# Patient Record
Sex: FEMALE | Race: WHITE | NOT HISPANIC OR LATINO | Employment: OTHER | URBAN - METROPOLITAN AREA
[De-identification: names, ages, dates, MRNs, and addresses within clinical notes are randomized per-mention and may not be internally consistent; named-entity substitution may affect disease eponyms.]

---

## 2023-12-20 ENCOUNTER — APPOINTMENT (OUTPATIENT)
Dept: NEUROLOGY | Facility: CLINIC | Age: 53
End: 2023-12-20
Payer: COMMERCIAL

## 2024-02-21 ENCOUNTER — APPOINTMENT (OUTPATIENT)
Dept: NEUROLOGY | Facility: CLINIC | Age: 54
End: 2024-02-21
Payer: COMMERCIAL

## 2024-02-26 ENCOUNTER — APPOINTMENT (OUTPATIENT)
Dept: NEUROLOGY | Facility: CLINIC | Age: 54
End: 2024-02-26
Payer: COMMERCIAL

## 2024-07-03 ENCOUNTER — APPOINTMENT (OUTPATIENT)
Dept: NEUROLOGY | Facility: CLINIC | Age: 54
End: 2024-07-03
Payer: COMMERCIAL

## 2024-07-23 ENCOUNTER — APPOINTMENT (OUTPATIENT)
Dept: NEUROLOGY | Facility: CLINIC | Age: 54
End: 2024-07-23
Payer: COMMERCIAL

## 2024-07-23 VITALS
HEIGHT: 67 IN | WEIGHT: 169 LBS | DIASTOLIC BLOOD PRESSURE: 64 MMHG | BODY MASS INDEX: 26.53 KG/M2 | HEART RATE: 60 BPM | SYSTOLIC BLOOD PRESSURE: 106 MMHG

## 2024-07-23 DIAGNOSIS — G62.9 NEUROPATHY: ICD-10-CM

## 2024-07-23 DIAGNOSIS — G90.A POTS (POSTURAL ORTHOSTATIC TACHYCARDIA SYNDROME): Primary | ICD-10-CM

## 2024-07-23 DIAGNOSIS — R55 SYNCOPE, UNSPECIFIED SYNCOPE TYPE: ICD-10-CM

## 2024-07-23 PROCEDURE — 3008F BODY MASS INDEX DOCD: CPT | Performed by: SPECIALIST

## 2024-07-23 PROCEDURE — 99205 OFFICE O/P NEW HI 60 MIN: CPT | Performed by: SPECIALIST

## 2024-07-23 PROCEDURE — 1036F TOBACCO NON-USER: CPT | Performed by: SPECIALIST

## 2024-07-23 PROCEDURE — 99417 PROLNG OP E/M EACH 15 MIN: CPT | Performed by: SPECIALIST

## 2024-07-23 RX ORDER — CIPROFLOXACIN 500 MG/1
500 TABLET ORAL 2 TIMES DAILY
COMMUNITY

## 2024-07-23 RX ORDER — DOXYCYCLINE HYCLATE 100 MG
100 TABLET ORAL 2 TIMES DAILY
COMMUNITY
Start: 2024-02-23

## 2024-07-23 RX ORDER — IVABRADINE 5 MG/1
5 TABLET, FILM COATED ORAL
COMMUNITY
Start: 2023-02-19

## 2024-07-23 RX ORDER — ROSUVASTATIN CALCIUM 10 MG/1
10 TABLET, COATED ORAL DAILY
COMMUNITY

## 2024-07-23 RX ORDER — NEBIVOLOL 10 MG/1
2.5 TABLET ORAL
COMMUNITY

## 2024-07-23 RX ORDER — POLYETHYLENE GLYCOL-3350 AND ELECTROLYTES 236; 6.74; 5.86; 2.97; 22.74 G/274.31G; G/274.31G; G/274.31G; G/274.31G; G/274.31G
4000 POWDER, FOR SOLUTION ORAL
COMMUNITY
Start: 2023-12-05

## 2024-07-23 RX ORDER — ICOSAPENT ETHYL 1 G/1
2 CAPSULE ORAL 2 TIMES DAILY
COMMUNITY
Start: 2022-10-12

## 2024-07-23 RX ORDER — METHYLPREDNISOLONE 4 MG/1
4 TABLET ORAL
COMMUNITY
Start: 2024-06-28

## 2024-07-23 RX ORDER — METOPROLOL SUCCINATE 25 MG/1
25 TABLET, EXTENDED RELEASE ORAL DAILY
COMMUNITY

## 2024-07-23 RX ORDER — PANTOPRAZOLE SODIUM 40 MG/1
40 TABLET, DELAYED RELEASE ORAL
COMMUNITY
Start: 2022-08-25

## 2024-07-23 RX ORDER — EZETIMIBE 10 MG/1
5 TABLET ORAL NIGHTLY
COMMUNITY
Start: 2023-10-20

## 2024-07-23 RX ORDER — RANOLAZINE 500 MG/1
500 TABLET, EXTENDED RELEASE ORAL 2 TIMES DAILY
COMMUNITY

## 2024-07-23 RX ORDER — DOXYCYCLINE 100 MG/1
100 CAPSULE ORAL 2 TIMES DAILY
COMMUNITY

## 2024-07-23 ASSESSMENT — PATIENT HEALTH QUESTIONNAIRE - PHQ9
1. LITTLE INTEREST OR PLEASURE IN DOING THINGS: NOT AT ALL
2. FEELING DOWN, DEPRESSED OR HOPELESS: NOT AT ALL
SUM OF ALL RESPONSES TO PHQ9 QUESTIONS 1 AND 2: 0

## 2024-07-23 ASSESSMENT — ENCOUNTER SYMPTOMS
DEPRESSION: 0
LOSS OF SENSATION IN FEET: 0
OCCASIONAL FEELINGS OF UNSTEADINESS: 0

## 2024-07-23 ASSESSMENT — ANXIETY QUESTIONNAIRES
1. FEELING NERVOUS, ANXIOUS, OR ON EDGE: NOT AT ALL
6. BECOMING EASILY ANNOYED OR IRRITABLE: NOT AT ALL
GAD7 TOTAL SCORE: 0
5. BEING SO RESTLESS THAT IT IS HARD TO SIT STILL: NOT AT ALL
2. NOT BEING ABLE TO STOP OR CONTROL WORRYING: NOT AT ALL
3. WORRYING TOO MUCH ABOUT DIFFERENT THINGS: NOT AT ALL
IF YOU CHECKED OFF ANY PROBLEMS ON THIS QUESTIONNAIRE, HOW DIFFICULT HAVE THESE PROBLEMS MADE IT FOR YOU TO DO YOUR WORK, TAKE CARE OF THINGS AT HOME, OR GET ALONG WITH OTHER PEOPLE: NOT DIFFICULT AT ALL
4. TROUBLE RELAXING: NOT AT ALL
7. FEELING AFRAID AS IF SOMETHING AWFUL MIGHT HAPPEN: NOT AT ALL

## 2024-07-23 NOTE — PROGRESS NOTES
Date of Service: 7/23/2024  Patient: Aimee Saldivar  MRN: [id number]  Referring Provider: No ref. provider found  Primary Care Physician: No primary care provider on file.     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    She was initially diagnosed with POTS in early 2003 about 20 years ago with her symptoms relatively resolved with salt tablets used.    She had a new remission in the last few years with symptoms including orthostatic palpitations, dizziness, fluctuating vision, headaches, tachycardia, graying of vision, dyspnea, rare syncope, fatigue, cold feet, headaches, difficulty with concentration, tingling of the feet, increase in sweating, bloating constipation dry mouth dry eyes and insomnia .    She was evaluated by different cardiologist in the past and she was tried on different medications including despite  Cardizem, Sotalol, Bisoprolol, Atenolol, Metoprolol, that she was unable to tolerate due to side effects including hypotension and bradycardia.    She has been recently on Bystolic and Corlanor she reports dissatisfaction with her current treatment due to significant side effects like low blood pressure and weight gain.      Her POTS symptoms had been worsening lately due to increased stressors.    The disease has been affecting her activities of daily living, she retired from nursing      Medical Management:  The patient has a history of Takotsubo cardiomyopathy, mild emphysema, and mild apical scarring, possibly related to radiation therapy for breast cancer. She previously used Ranexa, which was discontinued due to leg swelling      Additional  Medical History:  The patient has been treated for breast cancer with lumpectomy and radiation, and underwent a hysterectomy with preservation of her ovaries, followed by menopause a year later. She has a history of elevated cholesterol and manages it with Crestor and Ezetimibe.    Lifestyle:  The patient follows a mostly plant-based diet and quit smoking two years ago. She resides in [place] but frequently travels to [place] for medical care and other purposes.         Diagnostic Test Results:    - History of Tilt Table Test: Positive for POTS, specific date N/A       COMPLETE ECHOCARDIOGRAM EXERCISE STRESS TEST W/ STRAIN (11/07/2022     The maximal heart rate is 137 bpm which represents 82% of age predicted target HR. Exercise was stopped due to patient request. Beta blockers were continued for the test. No evidence of ischemia at achieved cardiac workload. Non-diagnostic stress ECG due to inability to achieve target heart rate and 82% of the target. Hypertensive blood pressure response during stress. Normal left ventricular systolic function with an ejection fraction of 55-60% at rest. No regional wall motion abnormalities during stress. The ejection fraction is 65-70% with stress. This is a non diagnostic stress echocardiogram due to the patients inability to achieve target heart rate. This test has reduced sensitivity due to low cardiac workload achieved. Global Longitudinal Strain (avg) is calculated at -14.7% with inferior strain abnormality.           Problems Assessed/Relevant:  Problem List Items Addressed This Visit    None      Social History     Tobacco Use    Smoking status: Never    Smokeless tobacco: Never   Substance Use Topics    Alcohol use: Never      Allergies   Allergen Reactions    Carisoprodol Hallucinations and Unknown     Hallucinations    Oxycodone Hallucinations and Unknown     Hallucinations    Propoxyphene Hallucinations    Tramadol Hallucinations and Unknown     Hallucinations    Adhesive  Unknown and Rash     Other reaction(s): REDNESS    Carisoprodol-Aspirin Unknown     Other reaction(s): Mental Status Change    Hallucinations    Prednisone Unknown     Other reaction(s): Other (see comments)    Icosapent Ethyl Swelling    Meperidine Hallucinations and Unknown     Hallucinations    Metoprolol Succinate Unknown     Tunnel vision, migraine, diarrhea    Oxycodone-Acetaminophen Unknown     Other reaction(s): Hallucinations    Propoxyphene N-Acetaminophen Hallucinations and Unknown     Other reaction(s): HALLUCINATIONS    Somatropin Hallucinations, Hives and Unknown     Other reaction(s): SEVERE HALLUCINATIONS        Medications:    Current Outpatient Medications:     ezetimibe (Zetia) 10 mg tablet, Take 0.5 tablets (5 mg) by mouth once daily at bedtime., Disp: , Rfl:     ivabradine (Corlanor) 5 mg tablet, Take 0.5 tablets (2.5 mg) by mouth 2 times a day., Disp: , Rfl:     metoprolol succinate XL (Toprol-XL) 25 mg 24 hr tablet, Take 1 tablet (25 mg) by mouth once daily., Disp: , Rfl:     nebivolol (Bystolic) 10 mg tablet, Take 2.5 mg by mouth., Disp: , Rfl:     ranolazine (Ranexa) 500 mg 12 hr tablet, Take 1 tablet (500 mg) by mouth 2 times a day. Do not crush, chew, or split., Disp: , Rfl:     rosuvastatin (Crestor) 10 mg tablet, Take 1 tablet (10 mg) by mouth early in the morning.., Disp: , Rfl:     ciprofloxacin (Cipro) 500 mg tablet, Take 1 tablet (500 mg) by mouth 2 times a day., Disp: , Rfl:     Corlanor 5 mg tablet, 1 tablet (5 mg)., Disp: , Rfl:     dietary supplement capsule, once daily., Disp: , Rfl:     doxycycline (Vibra-Tabs) 100 mg tablet, Take 1 tablet (100 mg) by mouth twice a day., Disp: , Rfl:     doxycycline (Vibramycin) 100 mg capsule, Take 1 capsule (100 mg) by mouth 2 times a day., Disp: , Rfl:     GaviLyte-G 236-22.74-6.74 -5.86 gram solution, Take 4,000 mL by mouth., Disp: , Rfl:     icosapent ethyL (Vascepa) 1 gram capsule, Take 2 capsules (2 g) by mouth 2 times a day., Disp: ,  "Rfl:     methylPREDNISolone (Medrol Dospak) 4 mg tablets, Take 1 tablet (4 mg) by mouth., Disp: , Rfl:     pantoprazole (ProtoNix) 40 mg EC tablet, Take 1 tablet (40 mg) by mouth., Disp: , Rfl:                     Physical Exam:     General Physical Exam:  /64   Pulse 60   Ht 1.702 m (5' 7\")   Wt 76.7 kg (169 lb)   BMI 26.47 kg/m²      She is not in any acute distress.  She has bilateral pes cavus, hyper flexibility of the joints with bilateral elbow and wrist hyperextension  Musculoskeletal: No scoliosis, lordosis, kyphosis,  or hammertoes     Neurological Exam:   Mental status reveals: alert and oriented to person, place, and date. Speech is intact to conversation. Fund of knowledge is normal.     Cranial nerves:  CN 2   Visual fields full to confrontation.   CN 3, 4, 6   Pupils round, 4 mm in diameter, equally reactive to light. Lids symmetric; no ptosis. EOMs normal alignment, full range.   No nystagmus.   CN 5   Facial sensation intact bilaterally.   CN 7   Normal and symmetric facial strength. Nasolabial folds symmetric.   CN 8   Hearing intact to conversation.   CN 9   Palate elevates symmetrically.   CN 11   Normal strength of shoulder shrug and neck turning.   CN 12   Tongue midline, with normal bulk and strength; no fasciculations.      Motor:  Muscle strength, bulk and tone are normal. There are no scapular winging, fasciculations, tremor or other abnormal movement.    Neck flexion and neck extension are normal (MRC 5/5).       Reflexes: Suppressed throughout       Plantars: toes downgoing to plantar stimulation. No clonus or other pathologic reflexes present.      Sensory:   Pinprick sensation and touch sensation are normal and symmetrical.  Position senses are normal at the toes.  Vibration senses are normal at the toes and ankles.  All sensory modalities were normal in the hands and upper extremities.       Coordination:  Finger-nose-finger is normal without dysmetria or overshoot and " heel-to-shin is normal. Rapid alternating movements in hands and feet are normal.     Gait:  Station is stable with a normal base. Gait is stable with a normal arm swing and speed. There is no ataxia, shuffling, steppage or waddling gait. Tandem gait is intact. Romberg sign is negative.      Impression/Plan:     Impression:  Aimee Saldivar is a 53 y.o. who presents with:    Postural Orthostatic Tachycardia Syndrome (POTS)    - Plan:    - Schedule a tilt table test to reassess POTS and autonomic function.    - Consider lab workup, skin biopsy, lip biopsy and adjusting her medications based on test results.    - Discuss the potential use of Mestinon (Pyridostigmine) starting at 30 mg three times daily, with a plan to reassess efficacy and tolerance.    - Arrange for a comprehensive cardiac evaluation including a Holter monitor,   and echocardiogram.    - Coordination with her cardiologist will be essential to manage her complex cardiac medications and conditions.    - EMG/NCS of the right upper and lower extremities to assess for peripheral neuropathy.      General Health and Lifestyle Management    - Plan:    - Encourage lifestyle modifications including the use of compression stockings and possibly an abdominal binder.    - Recommend continuation of salt intake as previously beneficial for POTS management.    -To address stressors that may affect her ability to cope with POTS.    - Schedule follow-up appointments to monitor her response to any new treatments and adjust as necessary.    Reviewed and approved by ARTEMIO BERNARDO on 7/23/24 at 12:29 PM.    I personally spent [75 ] minutes on the day of the visit completing the review of the medical record and outside records, obtaining history and performing an appropriate physical exam, patient care, counseling and education, placing orders, independently reviewing results, communicating with the patient coordinating care and performing appropriate clinical  documentation.      Ruth Garcia M.D.

## 2024-07-25 ENCOUNTER — PATIENT MESSAGE (OUTPATIENT)
Dept: NEUROLOGY | Facility: CLINIC | Age: 54
End: 2024-07-25
Payer: COMMERCIAL

## 2024-07-25 DIAGNOSIS — R51.9 NONINTRACTABLE HEADACHE, UNSPECIFIED CHRONICITY PATTERN, UNSPECIFIED HEADACHE TYPE: Primary | ICD-10-CM

## 2024-07-25 DIAGNOSIS — C50.912 MALIGNANT NEOPLASM OF LEFT FEMALE BREAST, UNSPECIFIED ESTROGEN RECEPTOR STATUS, UNSPECIFIED SITE OF BREAST (MULTI): ICD-10-CM

## 2024-07-26 NOTE — PROGRESS NOTES
The patient has been complaining of headache and concerned about it.   I will order MRI, MRA brain. She will be followed up.     Ruth

## 2024-07-29 ENCOUNTER — CLINICAL SUPPORT (OUTPATIENT)
Dept: PHYSICAL THERAPY | Facility: CLINIC | Age: 54
End: 2024-07-29
Payer: COMMERCIAL

## 2024-07-29 ENCOUNTER — TELEPHONE (OUTPATIENT)
Dept: PRIMARY CARE | Facility: CLINIC | Age: 54
End: 2024-07-29

## 2024-07-29 DIAGNOSIS — G90.A POTS (POSTURAL ORTHOSTATIC TACHYCARDIA SYNDROME): ICD-10-CM

## 2024-07-29 PROCEDURE — 97162 PT EVAL MOD COMPLEX 30 MIN: CPT | Mod: GP

## 2024-07-29 ASSESSMENT — ENCOUNTER SYMPTOMS
LOSS OF SENSATION IN FEET: 0
OCCASIONAL FEELINGS OF UNSTEADINESS: 0
DEPRESSION: 0

## 2024-07-29 NOTE — PROGRESS NOTES
Physical Therapy Initial Evaluation:  Vestibular and Balance      Patient Name:  Aimee Saldivar   MRN:  [id number]   Date of Evaluation:  07/29/24   Time Calculation  Start Time: 1701  Visit Number:  1  Insurance Information:  2024 45 COPAY, 0 DED, 6500 OOP MAX, 50 VS MARC YR PT/OT/ST, NO AUTH     1. POTS (postural orthostatic tachycardia syndrome)  Referral to Physical Therapy          Assessment: Aimee Saldivar is a 53 year old female referred to outpatient PT for POTS.  Much of the session was spent in discussing POTS and its exercise protocol.  As patient spends half the year in [place], this therapist and patient agreed that patient will focus on protocol on her own and check in with this therapist when she returns if she does not establish care in [place].  Based on patient's examination, concurrent co-morbidities, and presentation, patient's level of complexity is Moderate.  As a result, recommending continued PT services to facilitate improvement in CLOF.    Problems include:  Activity limitations, Aerobic capacity / endurance, Decreased functional level, Decreased knowledge of HEP, Dizziness / vertigo, and Participation restrictions    Goals:  By Discharge patient will demo:  Pitts in HEP  DHI improvement by 18% to match [facility]  Completion of POTS protocol    Potential to achieve rehab goals is fair.    Plan:  Patient to return as able if does not establish care for PT back in [place].    Activities that may be performed include but are not limited to:  balance, gait, transfers, modalities (as appropriate), e-stim (as appropriate), canalith repositioning maneuvers, therapeutic exercise, therapeutic activities.    Aimee Saldivar in agreement with and understanding of all discussed this date.    ----------------------------------  ----------------------------------    Subjective:    Onset Date:  90s-00s    HPI:  Aimee Saldivar is a 53 year old female referred to outpatient PT for POTS.  Lives in both [place] and [place] for half the  "year each.  In 90s-2000s was diagnosed with POTS.  Around that time, had walking pneumonia, woke up one morning, sat up, and went \"right to the floor.\"  Had a tilt table test done and diagnosed with OH and POTS.  It went into remission until 2 years ago when was outside in 90 degree heat doing lifting and HR skyrocketed.  Will be going through a smattering of testing being done (EMG, Tilt, MRI/MRA).  Will be looking into autoimmune disorders.  Does get random spin and drops of BP.  Resting HR when laying is 30s and shoots up to 90s when rising.  Does report a history of breast cancer.  Does have hypermobility.  Does have an autoimmune history.  Doesn't do any regular exercise because \"so afraid to do it.\"  On a beta blocker.    Patient Goal:  \"get some help with living with POTS\"     (note:  \"y\" = yes; \"n\" = no)      Precautions: none    Steadi Fall Risk  One or more falls in the last year? No  How many Times?    Was the patient injured in the fall?    Has trouble stepping onto curb? No  Advised to use a cane or walker to get around safely? No  Often has to rush to toilet? No  Feels unsteady when walking? No  Has lost some feeling in feet? No  Often feels sad or depressed? No  Steadies self on furniture while walking at home? No  Takes medicine that makes them feel lightheaded or more tired than usual? Yes  Worried about Falling? No  Takes medicine to sleep or improve mood? No  Needs to push with hands when rising from a chair? No        Pain:  no complaints at rest    ----------------------------------  ----------------------------------    OBJECTIVE    Observation: unable to do POTS zones calculations as patient is on a beta blocker    ROM: demoing hypermobility with thumb to forearm, 5th digit extension, palms to floor, knee hyperextension B, and elbow hyperextension bilateraly    Strength: no gross abnormalities noted with functional mobility    Coordination: no gross abnormalities noted with functional " mobility    Functional Mobility Assessment:  - Gait: no gross abnormalities noted with functional mobility  - Transfers:  no gross abnormalities noted with functional mobility  - Other: bending forward and rising -- no significant dizziness or LOB or complaints of heart rate racing    Outcomes:  DHI:  24%      TREATMENT:    Discussed at length POTS protocol.  Given for home with recommendations for intensity being measured with RPE.  Given all months.  Discussed various factors that can influence POTS and need to pursue the potential cause of POTS.  Encouraged to reach out to medical team to discuss sodium and her history of good response to IVIG for other conditions.

## 2024-07-29 NOTE — TELEPHONE ENCOUNTER
Per PT Ayo,   I was hoping I could schedule an appointment with you this coming week.   I will be here until the 20th of August returning in september.  I am up here FOR POTS testing.  I flew up here on the 9th and have not felt right since.  I do not have COVID or the flu, I checked.  I would love to come in and see you as I do need blood work done  and would like to have you as my primary as I will be up here more and more d/t dad.  Dr. BERNARDO is my neurologist up here for the POTS at . My number is [phone].   Thank you   Aimee Saldivar (Laure)

## 2024-08-05 ENCOUNTER — HOSPITAL ENCOUNTER (OUTPATIENT)
Dept: NEUROLOGY | Facility: HOSPITAL | Age: 54
Discharge: HOME | End: 2024-08-05
Payer: COMMERCIAL

## 2024-08-05 DIAGNOSIS — G62.9 NEUROPATHY: ICD-10-CM

## 2024-08-05 PROCEDURE — 95886 MUSC TEST DONE W/N TEST COMP: CPT | Performed by: PSYCHIATRY & NEUROLOGY

## 2024-08-05 PROCEDURE — 95911 NRV CNDJ TEST 9-10 STUDIES: CPT | Performed by: PSYCHIATRY & NEUROLOGY

## 2024-08-06 ENCOUNTER — HOSPITAL ENCOUNTER (OUTPATIENT)
Dept: CARDIOLOGY | Facility: CLINIC | Age: 54
Discharge: HOME | End: 2024-08-06
Payer: COMMERCIAL

## 2024-08-06 DIAGNOSIS — G90.A POTS (POSTURAL ORTHOSTATIC TACHYCARDIA SYNDROME): ICD-10-CM

## 2024-08-06 DIAGNOSIS — R55 SYNCOPE, UNSPECIFIED SYNCOPE TYPE: ICD-10-CM

## 2024-08-06 DIAGNOSIS — R55 SYNCOPE, UNSPECIFIED SYNCOPE TYPE: Primary | ICD-10-CM

## 2024-08-06 PROCEDURE — 93306 TTE W/DOPPLER COMPLETE: CPT | Performed by: STUDENT IN AN ORGANIZED HEALTH CARE EDUCATION/TRAINING PROGRAM

## 2024-08-06 PROCEDURE — 2500000004 HC RX 250 GENERAL PHARMACY W/ HCPCS (ALT 636 FOR OP/ED): Performed by: STUDENT IN AN ORGANIZED HEALTH CARE EDUCATION/TRAINING PROGRAM

## 2024-08-06 PROCEDURE — 93306 TTE W/DOPPLER COMPLETE: CPT

## 2024-08-06 PROCEDURE — 93270 REMOTE 30 DAY ECG REV/REPORT: CPT

## 2024-08-08 LAB
AORTIC VALVE PEAK VELOCITY: 1.24 M/S
AV PEAK GRADIENT: 6.1 MMHG
AVA (PEAK VEL): 2.44 CM2
EJECTION FRACTION: 63 %
LEFT VENTRICULAR OUTFLOW TRACT DIAMETER: 1.93 CM
MITRAL VALVE E/A RATIO: 1.7
RIGHT VENTRICLE FREE WALL PEAK S': 14 CM/S
RIGHT VENTRICLE PEAK SYSTOLIC PRESSURE: 30.2 MMHG
TRICUSPID ANNULAR PLANE SYSTOLIC EXCURSION: 1.7 CM

## 2024-08-14 ENCOUNTER — APPOINTMENT (OUTPATIENT)
Dept: NEUROLOGY | Facility: CLINIC | Age: 54
End: 2024-08-14
Payer: COMMERCIAL

## 2024-08-15 ENCOUNTER — HOSPITAL ENCOUNTER (OUTPATIENT)
Dept: NEUROLOGY | Facility: HOSPITAL | Age: 54
Discharge: HOME | End: 2024-08-15
Payer: COMMERCIAL

## 2024-08-15 DIAGNOSIS — R55 SYNCOPE, UNSPECIFIED SYNCOPE TYPE: ICD-10-CM

## 2024-08-15 PROCEDURE — 95923 AUTONOMIC NRV SYST FUNJ TEST: CPT | Performed by: PSYCHIATRY & NEUROLOGY

## 2024-08-15 PROCEDURE — 95924 ANS PARASYMP & SYMP W/TILT: CPT | Performed by: PSYCHIATRY & NEUROLOGY

## 2024-08-16 ENCOUNTER — APPOINTMENT (OUTPATIENT)
Dept: RADIOLOGY | Facility: HOSPITAL | Age: 54
End: 2024-08-16
Payer: COMMERCIAL

## 2024-08-16 ENCOUNTER — HOSPITAL ENCOUNTER (OUTPATIENT)
Dept: RADIOLOGY | Facility: HOSPITAL | Age: 54
Discharge: HOME | End: 2024-08-16
Payer: COMMERCIAL

## 2024-08-16 DIAGNOSIS — R51.9 NONINTRACTABLE HEADACHE, UNSPECIFIED CHRONICITY PATTERN, UNSPECIFIED HEADACHE TYPE: ICD-10-CM

## 2024-08-16 DIAGNOSIS — C50.912 MALIGNANT NEOPLASM OF LEFT FEMALE BREAST, UNSPECIFIED ESTROGEN RECEPTOR STATUS, UNSPECIFIED SITE OF BREAST (MULTI): ICD-10-CM

## 2024-08-16 PROCEDURE — 2550000001 HC RX 255 CONTRASTS: Performed by: SPECIALIST

## 2024-08-16 PROCEDURE — 70544 MR ANGIOGRAPHY HEAD W/O DYE: CPT

## 2024-08-16 PROCEDURE — 70553 MRI BRAIN STEM W/O & W/DYE: CPT

## 2024-08-16 PROCEDURE — A9575 INJ GADOTERATE MEGLUMI 0.1ML: HCPCS | Performed by: SPECIALIST

## 2024-08-16 RX ORDER — GADOTERATE MEGLUMINE 376.9 MG/ML
15 INJECTION INTRAVENOUS
Status: COMPLETED | OUTPATIENT
Start: 2024-08-16 | End: 2024-08-16

## 2024-08-19 ENCOUNTER — APPOINTMENT (OUTPATIENT)
Dept: NEUROLOGY | Facility: CLINIC | Age: 54
End: 2024-08-19
Payer: COMMERCIAL

## 2024-08-19 DIAGNOSIS — M24.9 HYPERMOBILITY OF JOINT: ICD-10-CM

## 2024-08-19 DIAGNOSIS — G90.A POTS (POSTURAL ORTHOSTATIC TACHYCARDIA SYNDROME): Primary | ICD-10-CM

## 2024-08-19 RX ORDER — PYRIDOSTIGMINE BROMIDE 60 MG/1
30 TABLET ORAL 3 TIMES DAILY
Qty: 45 TABLET | Refills: 11 | Status: SHIPPED | OUTPATIENT
Start: 2024-08-19 | End: 2025-08-19

## 2024-08-19 NOTE — PROGRESS NOTES
Date of Service: 8/19/2024  Patient: Aimee Saldivar  MRN: [id number]  Referring Provider: No ref. provider found  Primary Care Physician: No primary care provider on file.     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    She was initially diagnosed with POTS in early 2003 about 20 years ago with her symptoms relatively resolved with salt tablets used.    She had a new remission in the last few years with symptoms including orthostatic palpitations, dizziness, fluctuating vision, headaches, tachycardia, graying of vision, dyspnea, rare syncope, fatigue, cold feet, headaches, difficulty with concentration, tingling of the feet, increase in sweating, bloating constipation dry mouth dry eyes and insomnia .    She was evaluated by different cardiologist in the past and she was tried on different medications including despite  Cardizem, Sotalol, Bisoprolol, Atenolol, Metoprolol, that she was unable to tolerate due to side effects including hypotension and bradycardia.    She has been recently on Bystolic and Corlanor she reports dissatisfaction with her current treatment due to significant side effects like low blood pressure and weight gain.      Her POTS symptoms had been worsening lately due to increased stressors.    The disease has been affecting her activities of daily living, she retired from nursing      Medical Management:  The patient has a history of Takotsubo cardiomyopathy, mild emphysema, and mild apical scarring, possibly related to radiation therapy for breast cancer. She previously used Ranexa, which was discontinued due to leg swelling      Additional  Medical History:  The patient has been treated for breast cancer with lumpectomy and radiation, and underwent a hysterectomy with preservation of her ovaries, followed by menopause a year later. She has a history of elevated cholesterol and manages it with Crestor and Ezetimibe.    Lifestyle:  The patient follows a mostly plant-based diet and quit smoking two years ago. She resides in [place] but frequently travels to [place] for medical care and other purposes.    Since seen last, her condition did not change.  She has been complaining of dry eyes and  mouth    She had the following workup done:  Autonomic nervous system testing performed on August 15, 2024 which was reported as  an abnormal cardiovascular autonomic test panel, due the presence of a symptomatic accentuated orthostatic tachycardia, consistent with the diagnosis of Postural Orthostatic Tachycardia Syndrome (POTS). There is no evidence of a significant cardiovagal or cardiovascular adrenergic abnormality on the cardioautonomic reflex tests. Specifically, there is no evidence of orthostatic hypotension. Additionally, there is no evidence of a postganglionic sympathetic sudomotor abnormality like that seen in autonomic/small fiber neuropathy.        EMG nerve conduction study was performed on August 5, 2024 which reported as an essentially normal study. There is no electrophysiologic evidence of a large fiber peripheral neuropathy.     The borderline changes in motor unit morphology in right flexor hallucis longus and long head of biceps femoris muscles are of unclear significance.     TTE:   1. The left ventricular systolic function is normal, with a visually estimated ejection fraction of 60-65%.   2. There is normal right ventricular global systolic function.   3. Slightly elevated RVSP.    Cardiac monitoring is ongoing (results are pending)     She was evaluated by physical therapy and was recommended to have further evaluation for joint hypermobility   (Yuri-Danlos Syndrome)     Prior Diagnostic Test Results:    - History of Tilt Table Test: Positive for POTS, specific date N/A       COMPLETE ECHOCARDIOGRAM EXERCISE STRESS TEST W/ STRAIN (11/07/2022     The maximal heart rate is 137 bpm which represents 82% of age predicted target HR. Exercise was stopped due to patient request. Beta blockers were continued for the test. No evidence of ischemia at achieved cardiac workload. Non-diagnostic stress ECG due to inability to achieve target heart rate and 82% of the target. Hypertensive blood pressure response during stress. Normal left ventricular systolic function with an ejection fraction of 55-60% at rest. No regional wall motion abnormalities during stress. The ejection fraction is 65-70% with stress. This is a non diagnostic stress echocardiogram due to the patients inability to achieve target heart rate. This test has reduced sensitivity due to low cardiac workload achieved. Global Longitudinal Strain (avg) is calculated at -14.7% with inferior strain abnormality.           Problems Assessed/Relevant:  Problem List Items Addressed This Visit    None      Social History     Tobacco Use    Smoking status: Never    Smokeless tobacco: Never   Substance Use Topics    Alcohol use: Never      Allergies   Allergen Reactions    Carisoprodol Hallucinations and Unknown     Hallucinations    Oxycodone Hallucinations and Unknown     Hallucinations    Propoxyphene Hallucinations    Tramadol Hallucinations and Unknown     Hallucinations    Adhesive Unknown and Rash     Other reaction(s): REDNESS    Carisoprodol-Aspirin Unknown     Other reaction(s): Mental Status Change    Hallucinations    Prednisone Unknown     Other reaction(s): Other (see comments)    Icosapent Ethyl Swelling    Meperidine Hallucinations and Unknown     Hallucinations    Metoprolol Succinate Unknown     Tunnel vision, migraine, diarrhea    Oxycodone-Acetaminophen Unknown     Other reaction(s):  Hallucinations    Propoxyphene N-Acetaminophen Hallucinations and Unknown     Other reaction(s): HALLUCINATIONS    Somatropin Hallucinations, Hives and Unknown     Other reaction(s): SEVERE HALLUCINATIONS        Medications:    Current Outpatient Medications:     dietary supplement capsule, once daily., Disp: , Rfl:     ezetimibe (Zetia) 10 mg tablet, Take 0.5 tablets (5 mg) by mouth once daily at bedtime., Disp: , Rfl:     GaviLyte-G 236-22.74-6.74 -5.86 gram solution, Take 4,000 mL by mouth., Disp: , Rfl:     icosapent ethyL (Vascepa) 1 gram capsule, Take 2 capsules (2 g) by mouth 2 times a day., Disp: , Rfl:     ivabradine (Corlanor) 5 mg tablet, Take 0.5 tablets (2.5 mg) by mouth 2 times a day., Disp: , Rfl:     nebivolol (Bystolic) 10 mg tablet, Take 2.5 mg by mouth., Disp: , Rfl:     rosuvastatin (Crestor) 10 mg tablet, Take 1 tablet (10 mg) by mouth early in the morning.., Disp: , Rfl:                     Physical Exam:     She appears in no active distress.  ( Virtual visit).         Impression/Plan:     Impression:  Aimee Saldivar is a 53 y.o. who presents with:    Postural Orthostatic Tachycardia Syndrome (POTS), with no evidence of large fiber neuropathy, significant structural cardiac changes on transthoracic echocardiogram, cardiac monitoring is pending    - Plan:    - Schedule a tilt table test to reassess POTS and autonomic function.    - Consider lab workup,      -  Mestinon (Pyridostigmine) starting at 30 mg three times daily, with a plan to reassess efficacy and tolerance.    -To continue on Corlanor and Bystolic    -Await for cardiac monitoring results      -Referral to Genetics to evaluate for possible Yuri-Danlos syndrome     The patient was recommended to:   - Drink 2-3 liters of fluids/ day  -Increase salt intake up to 3 tea spoons/day  -Use compression socks and high waist during the day time   -Monitor  blood pressure and heart rate    -Exercise ( non weight baring) swimming, rowing, and  biking.    -Raise the head of the bed ( bed elevation about 45 degrees).      -Follow up in about  2-3 months or sooner as needed.        Reviewed and approved by ARTEMIO BERNARDO on 8/19/24 at 10:40 AM.    I personally spent [30 ] minutes on the day of the visit completing the review of the medical record and outside records, obtaining history, consulting the patient        Artemio Bernardo M.D.

## 2024-08-20 ENCOUNTER — LAB (OUTPATIENT)
Dept: LAB | Facility: LAB | Age: 54
End: 2024-08-20
Payer: COMMERCIAL

## 2024-08-20 DIAGNOSIS — G90.A POTS (POSTURAL ORTHOSTATIC TACHYCARDIA SYNDROME): ICD-10-CM

## 2024-08-20 LAB
CENTROMERE B AB SER-ACNC: <0.2 AI
CHROMATIN AB SERPL-ACNC: <0.2 AI
CRP SERPL-MCNC: 0.6 MG/DL
DSDNA AB SER-ACNC: <1 IU/ML
ENA JO1 AB SER QL IA: <0.2 AI
ENA RNP AB SER IA-ACNC: 0.2 AI
ENA SCL70 AB SER QL IA: <0.2 AI
ENA SM AB SER IA-ACNC: <0.2 AI
ENA SM+RNP AB SER QL IA: <0.2 AI
ENA SS-A AB SER IA-ACNC: <0.2 AI
ENA SS-B AB SER IA-ACNC: <0.2 AI
ERYTHROCYTE [SEDIMENTATION RATE] IN BLOOD BY WESTERGREN METHOD: 15 MM/H (ref 0–30)
HCYS SERPL-SCNC: 11.94 UMOL/L (ref 5–13.9)
IRON SATN MFR SERPL: 30 % (ref 25–45)
IRON SERPL-MCNC: 84 UG/DL (ref 35–150)
PROT SERPL-MCNC: 7.4 G/DL (ref 6.4–8.2)
RHEUMATOID FACT SER NEPH-ACNC: <10 IU/ML (ref 0–15)
RIBOSOMAL P AB SER-ACNC: <0.2 AI
T3 SERPL-MCNC: 119 NG/DL (ref 60–200)
T4 SERPL-MCNC: 6.6 UG/DL (ref 4.5–11.1)
THYROPEROXIDASE AB SERPL-ACNC: 36 IU/ML
TIBC SERPL-MCNC: 278 UG/DL (ref 240–445)
UIBC SERPL-MCNC: 194 UG/DL (ref 110–370)
VIT B12 SERPL-MCNC: 311 PG/ML (ref 211–911)

## 2024-08-20 PROCEDURE — 84436 ASSAY OF TOTAL THYROXINE: CPT

## 2024-08-20 PROCEDURE — 83540 ASSAY OF IRON: CPT

## 2024-08-20 PROCEDURE — 84207 ASSAY OF VITAMIN B-6: CPT

## 2024-08-20 PROCEDURE — 82384 ASSAY THREE CATECHOLAMINES: CPT

## 2024-08-20 PROCEDURE — 86038 ANTINUCLEAR ANTIBODIES: CPT

## 2024-08-20 PROCEDURE — 85652 RBC SED RATE AUTOMATED: CPT

## 2024-08-20 PROCEDURE — 82607 VITAMIN B-12: CPT

## 2024-08-20 PROCEDURE — 82085 ASSAY OF ALDOLASE: CPT

## 2024-08-20 PROCEDURE — 86255 FLUORESCENT ANTIBODY SCREEN: CPT

## 2024-08-20 PROCEDURE — 83550 IRON BINDING TEST: CPT

## 2024-08-20 PROCEDURE — 82164 ANGIOTENSIN I ENZYME TEST: CPT

## 2024-08-20 PROCEDURE — 83519 RIA NONANTIBODY: CPT

## 2024-08-20 PROCEDURE — 83520 IMMUNOASSAY QUANT NOS NONAB: CPT

## 2024-08-20 PROCEDURE — 84432 ASSAY OF THYROGLOBULIN: CPT

## 2024-08-20 PROCEDURE — 86431 RHEUMATOID FACTOR QUANT: CPT

## 2024-08-20 PROCEDURE — 36415 COLL VENOUS BLD VENIPUNCTURE: CPT

## 2024-08-20 PROCEDURE — 84480 ASSAY TRIIODOTHYRONINE (T3): CPT

## 2024-08-20 PROCEDURE — 83835 ASSAY OF METANEPHRINES: CPT

## 2024-08-20 PROCEDURE — 84446 ASSAY OF VITAMIN E: CPT

## 2024-08-20 PROCEDURE — 84165 PROTEIN E-PHORESIS SERUM: CPT

## 2024-08-20 PROCEDURE — 83090 ASSAY OF HOMOCYSTEINE: CPT

## 2024-08-20 PROCEDURE — 86225 DNA ANTIBODY NATIVE: CPT

## 2024-08-20 PROCEDURE — 86800 THYROGLOBULIN ANTIBODY: CPT

## 2024-08-20 PROCEDURE — 86235 NUCLEAR ANTIGEN ANTIBODY: CPT

## 2024-08-20 PROCEDURE — 86376 MICROSOMAL ANTIBODY EACH: CPT

## 2024-08-20 PROCEDURE — 84155 ASSAY OF PROTEIN SERUM: CPT

## 2024-08-20 PROCEDURE — 86140 C-REACTIVE PROTEIN: CPT

## 2024-08-20 PROCEDURE — 83921 ORGANIC ACID SINGLE QUANT: CPT

## 2024-08-21 ENCOUNTER — PATIENT MESSAGE (OUTPATIENT)
Dept: GENETICS | Facility: CLINIC | Age: 54
End: 2024-08-21

## 2024-08-21 ENCOUNTER — LAB (OUTPATIENT)
Dept: LAB | Facility: LAB | Age: 54
End: 2024-08-21
Payer: COMMERCIAL

## 2024-08-21 DIAGNOSIS — G90.A POTS (POSTURAL ORTHOSTATIC TACHYCARDIA SYNDROME): ICD-10-CM

## 2024-08-21 LAB
GLUCOSE 1H P 75 G GLC PO SERPL-MCNC: 154 MG/DL
GLUCOSE 2H P 75 G GLC PO SERPL-MCNC: 113 MG/DL
GLUCOSE 3H P 75 G GLC PO SERPL-MCNC: 80 MG/DL
GLUCOSE P FAST SERPL-MCNC: 80 MG/DL

## 2024-08-21 PROCEDURE — 82951 GLUCOSE TOLERANCE TEST (GTT): CPT

## 2024-08-21 PROCEDURE — 82952 GTT-ADDED SAMPLES: CPT

## 2024-08-21 PROCEDURE — 36415 COLL VENOUS BLD VENIPUNCTURE: CPT

## 2024-08-22 LAB
BILL ONLY-THYROGLOBULIN: NORMAL
THYROGLOB AB SERPL-ACNC: <0.9 IU/ML (ref 0–4)
THYROGLOB SERPL-MCNC: 7.6 NG/ML (ref 1.3–31.8)
THYROGLOB SERPL-MCNC: NORMAL NG/ML (ref 1.3–31.8)
TRYPTASE SERPL-MCNC: 6.9 UG/L

## 2024-08-22 PROCEDURE — 83835 ASSAY OF METANEPHRINES: CPT

## 2024-08-23 LAB
ACE SERPL-CCNC: 17 U/L (ref 16–85)
ALBUMIN: 4.5 G/DL (ref 3.4–5)
ALDOLASE SERPL-CCNC: 3.8 U/L (ref 1.2–7.6)
ALPHA 1 GLOBULIN: 0.3 G/DL (ref 0.2–0.6)
ALPHA 2 GLOBULIN: 0.8 G/DL (ref 0.4–1.1)
ANA SER QL HEP2 SUBST: NEGATIVE
BETA GLOBULIN: 0.9 G/DL (ref 0.5–1.2)
GAMMA GLOBULIN: 0.9 G/DL (ref 0.5–1.4)
PATH REVIEW-SERUM PROTEIN ELECTROPHORESIS: NORMAL
PCA IGG SER-ACNC: 5.5 UNITS (ref 0–24.9)
PROTEIN ELECTROPHORESIS COMMENT: NORMAL

## 2024-08-24 LAB
A-TOCOPHEROL VIT E SERPL-MCNC: 24.9 MG/L (ref 5.5–18)
ANNOTATION COMMENT IMP: NORMAL
BETA+GAMMA TOCOPHEROL SERPL-MCNC: 2.5 MG/L (ref 0–6)
METANEPHS SERPL-SCNC: 0.17 NMOL/L (ref 0–0.49)
NORMETANEPH FREE SERPL-SCNC: 0.4 NMOL/L (ref 0–0.89)
PYRIDOXAL PHOS SERPL-SCNC: 32.8 NMOL/L (ref 20–125)

## 2024-08-26 LAB
COLLECT DURATION TIME SPEC: 24 HR
CREAT 24H UR-MRATE: 585 MG/D (ref 500–1400)
CREAT UR-MCNC: 65 MG/DL
DOPAMINE SERPL-MCNC: 35 PG/ML (ref 0–48)
EPINEPH PLAS-MCNC: <15 PG/ML (ref 0–62)
METANEPH 24H UR-MCNC: 65 UG/L
METANEPH 24H UR-MRATE: 58 UG/D (ref 36–229)
METANEPH+NORMETANEPH UR-IMP: NORMAL
METANEPH/CREAT 24H UR: 100 UG/G CRT (ref 0–300)
NOREPINEPH PLAS-MCNC: 526 PG/ML (ref 0–874)
NORMETANEPHRINE 24H UR-MCNC: 127 UG/L
NORMETANEPHRINE 24H UR-MRATE: 114 UG/D (ref 95–650)
NORMETANEPHRINE/CREAT 24H UR: 195 UG/G CRT (ref 0–400)
SPECIMEN VOL ?TM UR: 900 ML

## 2024-08-27 LAB — METHYLMALONATE SERPL-SCNC: 0.19 UMOL/L (ref 0–0.4)

## 2024-08-29 LAB
AMPHIPHYSIN IGG SER QL IA: NEGATIVE
ANNOTATION COMMENT IMP: NORMAL
CV2 AB SERPL QL IF: NEGATIVE
GLIAL NUC TYPE 1 AB SER QL IF: NEGATIVE
HU1 AB SER QL: NEGATIVE
HU2 AB SER QL IF: NEGATIVE
HU3 AB SER QL: NEGATIVE
PARANEOPLASTIC AB SER-IMP: NORMAL
PCA-1 AB SER QL IF: NEGATIVE
PCA-2 AB SER QL IF: NEGATIVE
PCA-TR AB SER QL IF: NEGATIVE
VGCC-P/Q BIND IGG+IGM SER IA-SCNC: 0 NMOL/L
VGKC IGG+IGM SER IA-SCNC: 0 NMOL/L

## 2024-09-11 ENCOUNTER — APPOINTMENT (OUTPATIENT)
Dept: NEUROLOGY | Facility: HOSPITAL | Age: 54
End: 2024-09-11
Payer: COMMERCIAL

## 2024-09-11 ENCOUNTER — OFFICE VISIT (OUTPATIENT)
Dept: NEUROLOGY | Facility: CLINIC | Age: 54
End: 2024-09-11
Payer: COMMERCIAL

## 2024-09-11 VITALS
HEIGHT: 67 IN | SYSTOLIC BLOOD PRESSURE: 108 MMHG | WEIGHT: 168 LBS | BODY MASS INDEX: 26.37 KG/M2 | HEART RATE: 60 BPM | DIASTOLIC BLOOD PRESSURE: 60 MMHG

## 2024-09-11 DIAGNOSIS — G90.A POTS (POSTURAL ORTHOSTATIC TACHYCARDIA SYNDROME): Primary | ICD-10-CM

## 2024-09-11 PROCEDURE — 99215 OFFICE O/P EST HI 40 MIN: CPT | Performed by: SPECIALIST

## 2024-09-11 PROCEDURE — 1036F TOBACCO NON-USER: CPT | Performed by: SPECIALIST

## 2024-09-11 PROCEDURE — 3008F BODY MASS INDEX DOCD: CPT | Performed by: SPECIALIST

## 2024-09-11 RX ORDER — DOXYCYCLINE 100 MG/1
100 CAPSULE ORAL 2 TIMES DAILY
COMMUNITY
Start: 2024-09-06 | End: 2024-09-16

## 2024-09-11 RX ORDER — FLUCONAZOLE 150 MG/1
150 TABLET ORAL ONCE
COMMUNITY
Start: 2024-07-30

## 2024-09-11 RX ORDER — METHYLPREDNISOLONE 4 MG/1
4 TABLET ORAL ONCE
COMMUNITY
Start: 2024-09-06 | End: 2024-09-12

## 2024-09-11 RX ORDER — ALPRAZOLAM 0.25 MG/1
0.25 TABLET ORAL
COMMUNITY
Start: 2024-09-03

## 2024-09-11 RX ORDER — PROPRANOLOL HYDROCHLORIDE 10 MG/1
10 TABLET ORAL 3 TIMES DAILY
COMMUNITY
Start: 2024-09-05 | End: 2024-12-04

## 2024-09-11 RX ORDER — TIOTROPIUM BROMIDE INHALATION SPRAY 1.56 UG/1
2 SPRAY, METERED RESPIRATORY (INHALATION)
COMMUNITY
Start: 2024-09-03

## 2024-09-11 RX ORDER — VARENICLINE TARTRATE 0.5 (11)-1
0.5 KIT ORAL 2 TIMES DAILY
COMMUNITY
Start: 2024-09-03

## 2024-09-11 RX ORDER — CYCLOSPORINE 0.5 MG/ML
1 EMULSION OPHTHALMIC
COMMUNITY
Start: 2024-08-14

## 2024-09-11 RX ORDER — ALBUTEROL SULFATE 90 UG/1
2 INHALANT RESPIRATORY (INHALATION) EVERY 4 HOURS PRN
COMMUNITY
Start: 2024-08-25

## 2024-09-11 ASSESSMENT — ANXIETY QUESTIONNAIRES
1. FEELING NERVOUS, ANXIOUS, OR ON EDGE: NOT AT ALL
6. BECOMING EASILY ANNOYED OR IRRITABLE: NOT AT ALL
IF YOU CHECKED OFF ANY PROBLEMS ON THIS QUESTIONNAIRE, HOW DIFFICULT HAVE THESE PROBLEMS MADE IT FOR YOU TO DO YOUR WORK, TAKE CARE OF THINGS AT HOME, OR GET ALONG WITH OTHER PEOPLE: NOT DIFFICULT AT ALL
5. BEING SO RESTLESS THAT IT IS HARD TO SIT STILL: NOT AT ALL
2. NOT BEING ABLE TO STOP OR CONTROL WORRYING: NOT AT ALL
7. FEELING AFRAID AS IF SOMETHING AWFUL MIGHT HAPPEN: NOT AT ALL
GAD7 TOTAL SCORE: 0
3. WORRYING TOO MUCH ABOUT DIFFERENT THINGS: NOT AT ALL
4. TROUBLE RELAXING: NOT AT ALL

## 2024-09-11 ASSESSMENT — PATIENT HEALTH QUESTIONNAIRE - PHQ9
SUM OF ALL RESPONSES TO PHQ9 QUESTIONS 1 AND 2: 0
2. FEELING DOWN, DEPRESSED OR HOPELESS: NOT AT ALL
1. LITTLE INTEREST OR PLEASURE IN DOING THINGS: NOT AT ALL

## 2024-09-11 ASSESSMENT — ENCOUNTER SYMPTOMS
DEPRESSION: 0
OCCASIONAL FEELINGS OF UNSTEADINESS: 0
LOSS OF SENSATION IN FEET: 0

## 2024-09-11 NOTE — PROGRESS NOTES
The patient was seen in my office for further evaluation, Postural Orthostatic Tachycardia Syndrome (POTS), with no evidence of large fiber neuropathy.    She had the following work up done:     Diagnostic Test Results:  - Autonomic nervous system testing reported:   This is an abnormal cardiovascular autonomic test panel, due the presence of a symptomatic accentuated orthostatic tachycardia, consistent with the diagnosis of Postural Orthostatic Tachycardia Syndrome (POTS). There is no evidence of a significant cardiovagal or cardiovascular adrenergic abnormality on the cardioautonomic reflex tests. Specifically, there is no evidence of orthostatic hypotension. Additionally, there is no evidence of a postganglionic sympathetic sudomotor abnormality like that seen in autonomic/small fiber neuropathy.     - EMG and Nerve Conduction Study: Performed on May 5, 2024, results were normal with no evidence of large fiber axonal neuropathy    - Holter Monitor: Normal with an average heart rate of 58 bpm while on medication.           Since see last , she continues to experience lightheadedness and symptoms indicative of orthostatic hypotension.  The patient  attempted treatment with Mestinon at a dosage of 30 mg three times daily but encountered adverse effects, leading to an improvement after reducing the dose significantly. Currently, she is on 2.5 mg of Corlanor twice daily and 2.5 mg of Bystolic three times a day.    Symptoms:  She consistently experiences lightheadedness, dizziness, cold extremities, and a prickly sensation that commenced two weeks prior. The patient notes her hands and feet feel exceptionally cold, accompanied by a continuous vibration sensation. To manage her condition, she has been diligently hydrating, utilizing compression socks, and monitoring her blood pressure and heart rate, which she reports as low.    She had physical therapy POTS ( [facility]).     Family History:  The patient has a  family history of Yuri-Danlos syndrome.    Additional Information:  She had a COVID-19 infection in September of the previous year. The patient is engaged in preparations for cardiopulmonary rehabilitation and has consulted a physical therapist who suspects Yuri-Danlos syndrome. With an upcoming trip to [place], she expresses concern over medication adjustments due to the potential for blood pressure variability.           I have personally reviewed the patient's lab work and results for the last year:  Lab on 08/21/2024   Component Date Value Ref Range Status    Glucose, Fasting  08/21/2024 80  mg/dL Final    Glucose, One hour 08/21/2024 154  mg/dL Final    Glucose, Two hour 08/21/2024 113  mg/dL Final    Glucose, Three hour 08/21/2024 80  mg/dL Final    Metanephrines, Urine Interpretation 08/22/2024 See Note   Final    Comment: TEST INFORMATION: Metanephrines Fractionated, Urine    Smaller increases in metanephrine and/or normetanephrine   concentrations (less than two times the upper reference limit)   usually are the result of physiological stimuli, drugs, or   improper specimen collection. Essential hypertension is often   associated with slight elevations (metanephrine less than 400 ug/d   and normetanephrine less than 900 ug/d). Elevated concentrations   may be due to intense physical activity, life-threatening illness,   and drug interferences.     Significant elevation of one or both metanephrines (three or more   times the upper reference limit) is associated with an increased   probability of a neuroendocrine tumor.    Access complete set of age- and/or gender-specific reference   intervals for this test in the [facility] Test Directory   ([facility]).    This test was developed and its performance characteristics   determined by [facility]. It has not been cleared or   approved by the                            [place] Food and Drug Administration. This test was   performed in a [place]  certified laboratory and is intended for   clinical purposes.    Metanephrines, Urine 08/22/2024 65  ug/L Final    Metanephrines, 24H Urine 08/22/2024 58  36 - 229 ug/d Final    Metanephrine, Urine - ratio to CRT 08/22/2024 100  0 - 300 ug/g CRT Final    Normetanephrine, Urine 08/22/2024 127  ug/L Final    Normetanephrine, 24H Urine 08/22/2024 114  95 - 650 ug/d Final    Normetanephrine, Urine - ratio to * 08/22/2024 195  0 - 400 ug/g CRT Final    Creatinine, Urine - per volume 08/22/2024 65  mg/dL Final    Creatinine, 24H Ur 08/22/2024 585  500 - 1400 mg/d Final    Performed By: [facility]  [address] [place] [address]  : Harley Willson MD, PhD  CLIA Number: [id number]    Total Volume 08/22/2024 900  mL Final    Collection Interval, Ur 08/22/2024 24  hr Final      Per 24h calculations are provided to aid interpretation for   collections with a duration of 24 hours and an average daily urine   volume. For specimens with notable deviations in collection time   or volume, ratios of analytes to a corresponding urine creatinine   concentration may assist in result interpretation.   Lab on 08/20/2024   Component Date Value Ref Range Status    Normetanephrine 08/20/2024 0.40  0.00 - 0.89 nmol/L Final    Metanephrine 08/20/2024 0.17  0.00 - 0.49 nmol/L Final    Metanephrines Interpretation 08/20/2024 See Note   Final    Comment: INTERPRETIVE INFORMATION: Metanephrines, Plasma (Free)    This test is useful in the detection of pheochromocytoma, a rare   neuroendocrine tumor. The majority of patients with   pheochromocytoma have a plasma normetanephrine concentration in   excess of 2.2 nmol/L and/or a metanephrine concentration in excess   of 1.1 nmol/L. Increased concentrations of these analytes serve as   confirmation for diagnosis. Patients with essential hypertension   and plasma concentrations of normetanephrine below 0.9 nmol/L and   a metanephrine concentration below 0.5  nmol/L, can be excluded   from further testing. If clinical suspicion remains, repeat   testing or testing for metanephrines in a 24-hr. urine specimen   should be considered.    This test was developed and its performance characteristics   determined by [facility]. It has not been cleared or   approved by the US Food and Drug Administration. This test was   performed in a CLIA certified laboratory and is intended for   clinical                            purposes.  Performed By: [facility]  [address] [place] [address]  : Harley Willson MD, PhD  CLIA Number: [id number]    Vitamin E (Alpha-Tocopherol) 08/20/2024 24.9 (H)  5.5 - 18.0 mg/L Final      This test was developed and its performance characteristics   determined by [facility]. It has not been cleared or   approved by the US Food and Drug Administration. This test was   performed in a CLIA certified laboratory and is intended for   clinical purposes.    Vitamin E (Gamma-Tocopherol) 08/20/2024 2.5  0.0 - 6.0 mg/L Final    Performed By: [facility]  [address] [place] [address]  : Harley Willson MD, PhD  CLIA Number: [id number]    Vitamin B6 08/20/2024 32.8  20.0 - 125.0 nmol/L Final    INTERPRETIVE INFORMATION: Vitamin B6 (Pyridoxal 5-Phosphate)    Pyridoxal 5'-phosphate measured in a specimen collected following   an 8-hour or overnight fast accurately indicates vitamin B6   nutritional status. Non-fasting specimen concentration reflects   recent vitamin intake.    This test was developed and its performance characteristics   determined by [facility]. It has not been cleared or   approved by the US Food and Drug Administration. This test was   performed in a CLIA certified laboratory and is intended for   clinical purposes.  Performed By: [facility]  [address] [place] [address]  : Harley Willson MD, PhD  CLIA  Number: [id number]    Vitamin B12 08/20/2024 311  211 - 911 pg/mL Final    Thyroxine 08/20/2024 6.6  4.5 - 11.1 ug/dL Final    Triiodothyronine 08/20/2024 119  60 - 200 ng/dL Final    Sedimentation Rate 08/20/2024 15  0 - 30 mm/h Final    Rheumatoid Factor 08/20/2024 <10  0 - 15 IU/mL Final    Angio Converting Enzyme 08/20/2024 17  16 - 85 U/L Final    Performed By: [facility]  [address] [place] [address]  : Harley Willson MD, PhD  CLIA Number: [id number]    Iron 08/20/2024 84  35 - 150 ug/dL Final    UIBC 08/20/2024 194  110 - 370 ug/dL Final    TIBC 08/20/2024 278  240 - 445 ug/dL Final    % Saturation 08/20/2024 30  25 - 45 % Final    Parietal Cell Ab 08/20/2024 5.5  0.0 - 24.9 Units Final      In the context of vitamin B12 deficiency, the presence of gastric   parietal cell antibodies (PCA) and/or intrinsic factor antibodies   in association with macrocytic anemia is considered diagnostic for   pernicious anemia (PA). However, the presence of gastric PCAs   alone is not specific for PA. Gastric PCAs may occur with   increased frequency in unaffected family members, a small   percentage of healthy individuals, and patients with other   autoimmune diseases, such as autoimmune thyroiditis.  INTERPRETIVE INFORMATION: Gastric Parietal Cell Antibody, IgG    0.0-20.0 Units: Negative  20.1-24.9 Units: Equivocal  25.0 Units or greater: Positive  Performed By: [facility]  [address] [place] [address]  : Harley Willson MD, PhD  CLIA Number: [id number]    Interpretive Comments 08/20/2024 SEE COMMENTS   Final    A negative basic paraneoplastic evaluation result does not   rule out all clinically relevant antibodies. If indicated,   a comprehensive neurological phenotype-specific   autoimmune/paraneoplastic evaluation (e.g. encephalopathy,   movement disorders, myelopathy, or axonal neuropathy)   should be considered    [email or web address]  These evaluations include screening cell-based assays   optimized for detection of recently discovered antibodies.    IFA Notes 08/20/2024 None.   Final    Amphiphysin Ab, S 08/20/2024 Negative  Negative Final       -------------------ADDITIONAL INFORMATION-------------------  This test was developed and its performance characteristics   determined by [facility] in a manner consistent with CLIA   requirements. This test has not been cleared or approved by   the U.S. Food and Drug Administration.    AGNA-1, S 08/20/2024 Negative  Negative Final       -------------------ADDITIONAL INFORMATION-------------------  This test was developed and its performance characteristics   determined by [facility] in a manner consistent with CLIA   requirements. This test has not been cleared or approved by   the U.S. Food and Drug Administration.    KIMBERLY-1, S 08/20/2024 Negative  Negative Final       -------------------ADDITIONAL INFORMATION-------------------  This test was developed and its performance characteristics   determined by [facility] in a manner consistent with CLIA   requirements. This test has not been cleared or approved by   the U.S. Food and Drug Administration.    KIMBERLY-2, S 08/20/2024 Negative  Negative Final       -------------------ADDITIONAL INFORMATION-------------------  This test was developed and its performance characteristics   determined by [facility] in a manner consistent with CLIA   requirements. This test has not been cleared or approved by   the U.S. Food and Drug Administration.    KIMBERLY-3, S 08/20/2024 Negative  Negative Final       -------------------ADDITIONAL INFORMATION-------------------  This test was developed and its performance characteristics   determined by [facility] in a manner consistent with CLIA   requirements. This test has not been cleared or approved by   the U.S. Food and Drug Administration.     CRMP-5-IgG, S 08/20/2024 Negative  Negative Final       -------------------ADDITIONAL INFORMATION-------------------  This test was developed and its performance characteristics   determined by [facility] in a manner consistent with CLIA   requirements. This test has not been cleared or approved by   the U.S. Food and Drug Administration.    Neuronal (V-G) K+ Channel Ab, S 08/20/2024 0.00  <=0.02 nmol/L Final       -------------------ADDITIONAL INFORMATION-------------------  This test was developed and its performance characteristics   determined by [facility] in a manner consistent with CLIA   requirements. This test has not been cleared or approved by   the U.S. Food and Drug Administration.    P/Q-Type Calcium Channel Ab 08/20/2024 0.00  <=0.02 nmol/L Final       -------------------ADDITIONAL INFORMATION-------------------  This test was developed and its performance characteristics   determined by [facility] in a manner consistent with CLIA   requirements. This test has not been cleared or approved by   the U.S. Food and Drug Administration.    PCA-1, S 08/20/2024 Negative  Negative Final       -------------------ADDITIONAL INFORMATION-------------------  This test was developed and its performance characteristics   determined by [facility] in a manner consistent with CLIA   requirements. This test has not been cleared or approved by   the U.S. Food and Drug Administration.    PCA-2, S 08/20/2024 Negative  Negative Final       -------------------ADDITIONAL INFORMATION-------------------  This test was developed and its performance characteristics   determined by [facility] in a manner consistent with CLIA   requirements. This test has not been cleared or approved by   the U.S. Food and Drug Administration.    PCA-Tr, S 08/20/2024 Negative  Negative Final       -------------------ADDITIONAL INFORMATION-------------------  This test was developed and its performance characteristics   determined by [facility] in a manner consistent with CLIA   requirements. This test has not been cleared or approved by   the U.S. Food and Drug Administration.     Test Performed by:  [address] [address]  : Lucas Marlow Ph.D.; CLIA# [id number]    Methylmalonic Acid, S 08/20/2024 0.19  0.00 - 0.40 umol/L Final    INTERPRETIVE INFORMATION: MMA Serum/Plasma,                             Vitamin B12 Status    This test was developed and its performance characteristics   determined by [facility]. It has not been cleared or   approved by the US Food and Drug Administration. This test was   performed in a CLIA certified laboratory and is intended for   clinical purposes.  Performed By: [facility]  [address] [place] [address]  : Harley Willson MD, PhD  CLIA Number: [id number]    Homocysteine 08/20/2024 11.94  5.00 - 13.90 umol/L Final    Epinephrine 08/20/2024 <15  0 - 62 pg/mL Final    Norepinephrine 08/20/2024 526  0 - 874 pg/mL Final    Dopamine 08/20/2024 35  0 - 48 pg/mL Final    C-Reactive Protein 08/20/2024 0.60  <1.00 mg/dL Final    Thyroid Peroxidase (TPO) Antibody 08/20/2024 36  <=60 IU/mL Final    Thyroglobulin 08/20/2024 7.6  1.3 - 31.8 ng/mL Final    INTERPRETIVE INFORMATION: Thyroglobulin, Serum or Plasma    Specimens negative for thyroglobulin antibodies (TgAb) are tested   for thyroglobulin (Tg) by chemiluminescent immunoassay (RONEL) using   the [employer] [place] Access DxI method. Specimens with TgAb results   above the upper reference limit are tested for Tg by   high-performance liquid chromatography-tandem mass spectrometry   (LC-MS/MS). Results obtained with different test methods or kits   cannot be used interchangeably. Tg results, regardless of   concentration, should not be interpreted as absolute evidence for   the presence or absence of papillary or follicular thyroid cancer.   Tg  testing is not recommended for use as a screening procedure to   detect the presence of thyroid cancer in the general population.    Thyroglobulin LC-MS/MS 08/20/2024 Not Applicable  1.3 - 31.8 ng/mL Final    INTERPRETIVE INFORMATION: Thyroglobulin by LC-MS/MS, Serum/Plasma    Lower limit of detection for Thyroglobulin by LC-MS/MS is 0.5   ng/mL.    This test was developed and its performance characteristics   determined by [facility]. It has not been cleared or   approved by the US Food and Drug Administration. This test was   performed in a CLIA certified laboratory and is intended for   clinical purposes.  Performed By: [facility]  [address] [place] [address]  : Harley Willson MD, PhD  CLIA Number: [id number]    Anti-Thyroglobulin AB 08/20/2024 <0.9  0.0 - 4.0 IU/mL Final    INTERPRETIVE INFORMATION: Thyroglobulin Antibody                                    A value of 4.0 IU/mL or less indicates a negative result for   thyroglobulin antibodies.    The Thyroglobulin Antibody assay is being performed using the   [employer] [place] Access DxI method.    Aldolase 08/20/2024 3.8  1.2 - 7.6 U/L Final    REFERENCE INTERVAL: Aldolase    Access complete set of age- and/or gender-specific reference   intervals for this test in the [facility] Test Directory   ([email or web address]).  Performed By: [facility]  [address] [place] [address]  : Harley Willson MD, PhD  CLIA Number: [id number]    Tryptase 08/20/2024 6.9  <=10.9 ug/L Final    Performed By: [facility]  [address] [place] [address]  : Harley Willson MD, PhD  CLIA Number: [id number]    Total Protein 08/20/2024 7.4  6.4 - 8.2 g/dL Final    Albumin 08/20/2024 4.5  3.4 - 5.0 g/dL Final    Alpha 1 Globulin 08/20/2024 0.3  0.2 - 0.6 g/dL Final    Alpha 2 Globulin 08/20/2024 0.8  0.4 - 1.1 g/dL Final    Beta Globulin 08/20/2024 0.9  0.5 - 1.2  g/dL Final    Gamma 08/20/2024 0.9  0.5 - 1.4 g/dL Final    Protein Electrophoresis Comment 08/20/2024 Normal.   Final    Path Review - Serum Protein Electr* 08/20/2024 Reviewed and approved by BERTO MORALES on 8/23/24 at 12:31 PM.       Final    SIMRAN 08/20/2024 Negative  Negative Final    The Antinuclear Antibody (SIMRAN) test was performed using  indirect immunofluorescence assay with HEp-2 cells slide.    Anti-SM 08/20/2024 <0.2  <1.0 AI Final    < 1.0 = NEGATIVE  >=1.0 = POSITIVE    Anti-RNP 08/20/2024 0.2  <1.0 AI Final    < 1.0 = NEGATIVE  >=1.0 = POSITIVE    Anti-SM/RNP 08/20/2024 <0.2  <1.0 AI Final    < 1.0 = NEGATIVE  >=1.0 = POSITIVE    Anti-SSA 08/20/2024 <0.2  <1.0 AI Final    < 1.0 = NEGATIVE  >=1.0 = POSITIVE    Anti-SSB 08/20/2024 <0.2  <1.0 AI Final    < 1.0 = NEGATIVE  >=1.0 = POSITIVE    Anti-SCL-70 08/20/2024 <0.2  <1.0 AI Final    < 1.0 = NEGATIVE  >=1.0 = POSITIVE    Anti-BRISA-1 IgG 08/20/2024 <0.2  <1.0 AI Final    < 1.0 = NEGATIVE  >=1.0 = POSITIVE    Anti-Chromatin 08/20/2024 <0.2  <1.0 AI Final    < 1.0 = NEGATIVE  >=1.0 = POSITIVE    Anti-Centromere 08/20/2024 <0.2  <1.0 AI Final    < 1.0 = NEGATIVE  >=1.0 = POSITIVE    ANTI-RIBOSOMAL P 08/20/2024 <0.2  <1.0 AI Final    < 1.0 = NEGATIVE  >=1.0 = POSITIVE    Anti-DNA (DS) 08/20/2024 <1.0  <5.0 IU/mL Final    NEGATIVE: <= 4 IU/ML  EQUIVOCAL: 5- 9 IU/ML  POSITIVE: >=10 IU/ML    Bill Only Thyroglobulin 08/20/2024 Billed   Final    Performed By: [facility]  [address] [place] [address]  : Harley Willson MD, PhD  CLIA Number: [id number]   Hospital Outpatient Visit on 08/06/2024   Component Date Value Ref Range Status    AV pk saritha 08/06/2024 1.24  m/s Final    LVOT diam 08/06/2024 1.93  cm Final    MV E/A ratio 08/06/2024 1.70   Final    Tricuspid annular plane systolic e* 08/06/2024 1.7  cm Final    LV EF 08/06/2024 63  % Final    RV free wall pk S' 08/06/2024 14.00  cm/s Final    RVSP 08/06/2024 30.2  mmHg  "Final    Aortic Valve Area by Continuity of* 08/06/2024 2.44  cm2 Final    AV pk grad 08/06/2024 6.1  mmHg Final             Physical exam;  /60   Pulse 60   Ht 1.702 m (5' 7\")   Wt 76.2 kg (168 lb)   BMI 26.31 kg/m²   Non focal, awake, alert, oriented.   Cold hands with some discoloration.     Assessment and plan:  Partial Orthostatic Tachycardia Syndrome (POTS) and delayed Orthostatic Hypotension. Bradycardia with no evidence of large fiber neuropathy.    - Plan:    - Continue monitoring blood pressure and heart rate.    - Hold off on Mestinon for one week and reassess.    - Consider adjusting Corlanor dosage if blood pressure and heart rate remain low after stopping Mestinon.    - Continue using compression socks.    Raynaud's Syndrome     - Plan:    -  Consider starting Nifedipine ( Procardia XL).     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    - Continue physical therapy as recommended.    The patient was recommended to:   - Drink 2-3 liters of fluids/ day  -Increase salt intake up to 3 tea spoons/day  -Use compression socks and high waist during the day time   -Monitor  blood pressure and heart rate    -Exercise ( non weight baring) swimming, rowing, and biking.    -Raise the head of the bed ( bed elevation about 45 degrees).      -Follow up in about  3 months or sooner as needed.     Time spent 40 minutes including reviewing medical records includes examining, consulting the patient and discussing with the medical staff members.      Ruth Garcia M.D.    "

## 2024-12-24 ENCOUNTER — APPOINTMENT (OUTPATIENT)
Dept: GENETICS | Facility: CLINIC | Age: 54
End: 2024-12-24
Payer: COMMERCIAL

## 2025-01-14 ENCOUNTER — DOCUMENTATION (OUTPATIENT)
Dept: CARDIOLOGY | Facility: CLINIC | Age: 55
End: 2025-01-14
Payer: COMMERCIAL

## 2025-01-14 NOTE — LETTER
2025     Aimee Saldivar    Patient: Aimee Saldivar   YOB: 1970   Date of Visit: 2025       [facility]  Attn: Medical Review Department     Re: Medical Necessity for Tilt Table Testing / Autonomic Testing  Ref # [id number]  Provider: [facility]  Member #: [id number]  Patient: Aimee Saldivar   : 1970    Dear Medical Review Team,    I am writing to provide clarification and support for the necessity of the tilt table test and autonomic testing performed on my patient Aimee Saldivar  on August 15, 2024, to assess for Postural Orthostatic Tachycardia Syndrome (POTS). This diagnostic evaluation was essential given the patient’s complex medical history and the recurrence of troubling symptoms that significantly impact her daily life.    Aimee has experienced remission of her symptoms over the past few years; however, she has recently developed a resurgence of severe autonomic symptoms, including:Orthostatic palpitations,   Dizziness, Fluctuating vision and graying of vision, tachycardia, rare syncope in addition to other symptoms of dysautonomia : Headaches, Dyspnea, Fatigue, Cold feet, Decreased concentration,   Tingling in the feet, Increased sweating, Bloating, constipation, Dry mouth, eyes and Insomnia.    These symptoms have worsened, and there was a clinical need to assess her current autonomic function, track her disease progression, and determine the specific subtype of dysautonomia.  Specifically with significant and complex medical history Mrs. Saldivar has, including:  Breast cancer treated with radiation therapy, Takotsubo cardiomyopathy and mild apical scarring of the lungs.       Given the intricate nature of her medical conditions and the overlap of symptoms associated with autonomic dysfunction, this testing was critical to guide further treatment and management.  The results of these tests will allow us to determine the appropriate subtype of her  dysautonomia and adjust her treatment plan accordingly.    I hope this information clarifies the medical necessity of the testing. Please feel free to reach out if you require any additional documentation or further explanation.    Thank you for your attention to this matter.    Sincerely,  Ruth Garcia M.D.            CC: Mrs. Aimee Saldivar.   ______________________________________________________________________________________

## 2025-01-14 NOTE — PROGRESS NOTES
An insurance denial letter was sent to the patient to mail it out to the [facility].     Ruth Garcia M.D.